# Patient Record
Sex: FEMALE | Race: WHITE | NOT HISPANIC OR LATINO | URBAN - METROPOLITAN AREA
[De-identification: names, ages, dates, MRNs, and addresses within clinical notes are randomized per-mention and may not be internally consistent; named-entity substitution may affect disease eponyms.]

---

## 2021-04-14 NOTE — PATIENT DISCUSSION
continue one dropp of latanoprost OU at bedtime. Refer for evaluation and management with glaucoma specialist Dr. Jenifer Stone when available in the next month or 2.

## 2021-04-14 NOTE — PATIENT DISCUSSION
post LASIK OU, normal IOP OU, less than normal corneal thickness OU. Abnormal OCT OU. Large C/D OU. Visual field today not reliable OD and within normal limits OS and valid OS.  Guided progression analysis possible loss NFL thickness map progression OU and possible loss average NFL thickness OD. Negative family history of glaucoma. History of high myopia prior to refractive surgery.

## 2021-05-19 ENCOUNTER — NEW PATIENT COMPREHENSIVE (OUTPATIENT)
Dept: URBAN - METROPOLITAN AREA CLINIC 49 | Facility: CLINIC | Age: 71
End: 2021-05-19

## 2021-05-19 DIAGNOSIS — H35.363: ICD-10-CM

## 2021-05-19 DIAGNOSIS — H25.13: ICD-10-CM

## 2021-05-19 DIAGNOSIS — H35.373: ICD-10-CM

## 2021-05-19 DIAGNOSIS — H52.4: ICD-10-CM

## 2021-05-19 PROCEDURE — 92015 DETERMINE REFRACTIVE STATE: CPT

## 2021-05-19 PROCEDURE — 92134 CPTRZ OPH DX IMG PST SGM RTA: CPT

## 2021-05-19 PROCEDURE — 92004 COMPRE OPH EXAM NEW PT 1/>: CPT

## 2021-05-19 ASSESSMENT — VISUAL ACUITY
OS_CC: 20/20-2
OD_CC: 20/30-2
OD_GLARE: 20/25
OD_GLARE: 20/40
OU_CC: J1+
OS_GLARE: 20/50
OD_PH: 20/25

## 2021-05-19 ASSESSMENT — TONOMETRY
OD_IOP_MMHG: 15
OS_IOP_MMHG: 15

## 2022-05-25 ENCOUNTER — COMPREHENSIVE EXAM (OUTPATIENT)
Dept: URBAN - METROPOLITAN AREA CLINIC 49 | Facility: CLINIC | Age: 72
End: 2022-05-25

## 2022-05-25 DIAGNOSIS — H35.363: ICD-10-CM

## 2022-05-25 DIAGNOSIS — H35.373: ICD-10-CM

## 2022-05-25 DIAGNOSIS — H25.13: ICD-10-CM

## 2022-05-25 PROCEDURE — 92014 COMPRE OPH EXAM EST PT 1/>: CPT

## 2022-05-25 PROCEDURE — 92134 CPTRZ OPH DX IMG PST SGM RTA: CPT

## 2022-05-25 PROCEDURE — 92015 DETERMINE REFRACTIVE STATE: CPT

## 2022-05-25 ASSESSMENT — VISUAL ACUITY
OD_GLARE: 20/50
OU_CC: J2 @ 15IN
OS_CC: 20/25
OS_GLARE: 20/25
OS_GLARE: 20/20
OU_CC: 20/20
OD_CC: 20/40+1
OD_PH: 20/25
OD_GLARE: 20/25

## 2022-05-25 ASSESSMENT — TONOMETRY
OD_IOP_MMHG: 15
OS_IOP_MMHG: 15

## 2022-09-07 NOTE — PATIENT DISCUSSION
Abnormal OCT OU With indication of progression on guided progression analysis OU. Large C/D OU.  Negative family history of glaucoma. History of high myopia prior to refractive surgery. Over one hour spent directly with patient. The importance of complying with eyedrops and follow-up explained in detail. Permanent potential loss of vision explained in detail. History of noncompliance in the past. Treatment plan includes: continue one drop of latanoprost OU at bedtime and add 0.2% brimonidine eyedrops BID OS and return in one month for a visual field and pressure check. Did not go to glaucoma specialist as advise last visit. Possible SLT OU . Continue to monitor.

## 2023-05-31 ENCOUNTER — COMPREHENSIVE EXAM (OUTPATIENT)
Dept: URBAN - METROPOLITAN AREA CLINIC 49 | Facility: CLINIC | Age: 73
End: 2023-05-31

## 2023-05-31 DIAGNOSIS — H35.363: ICD-10-CM

## 2023-05-31 DIAGNOSIS — H25.13: ICD-10-CM

## 2023-05-31 DIAGNOSIS — H52.4: ICD-10-CM

## 2023-05-31 DIAGNOSIS — H35.373: ICD-10-CM

## 2023-05-31 PROCEDURE — 92014 COMPRE OPH EXAM EST PT 1/>: CPT

## 2023-05-31 PROCEDURE — 92015 DETERMINE REFRACTIVE STATE: CPT

## 2023-05-31 PROCEDURE — 92134 CPTRZ OPH DX IMG PST SGM RTA: CPT

## 2023-05-31 ASSESSMENT — TONOMETRY
OD_IOP_MMHG: 14
OS_IOP_MMHG: 15

## 2023-05-31 ASSESSMENT — VISUAL ACUITY
OS_GLARE: 20/40
OD_GLARE: 20/25
OS_CC: 20/40
OD_CC: 20/60
OU_CC: J2@16"
OD_PH: 20/50
OS_GLARE: 20/25
OD_GLARE: 20/40

## 2023-12-18 ENCOUNTER — CONTACT LENSES/GLASSES VISIT (OUTPATIENT)
Dept: URBAN - METROPOLITAN AREA CLINIC 49 | Facility: LOCATION | Age: 73
End: 2023-12-18

## 2023-12-18 DIAGNOSIS — H52.4: ICD-10-CM

## 2023-12-18 PROCEDURE — 92015GRNC REFRACTION GLASSES RECHECK NO CHARGE

## 2023-12-18 ASSESSMENT — VISUAL ACUITY
OD_PH: 20/30
OS_CC: 20/30+2
OD_CC: 20/50

## 2024-06-03 ENCOUNTER — COMPREHENSIVE EXAM (OUTPATIENT)
Dept: URBAN - METROPOLITAN AREA CLINIC 49 | Facility: LOCATION | Age: 74
End: 2024-06-03

## 2024-06-03 DIAGNOSIS — H35.373: ICD-10-CM

## 2024-06-03 DIAGNOSIS — H02.831: ICD-10-CM

## 2024-06-03 DIAGNOSIS — H25.13: ICD-10-CM

## 2024-06-03 DIAGNOSIS — H02.834: ICD-10-CM

## 2024-06-03 DIAGNOSIS — H35.363: ICD-10-CM

## 2024-06-03 DIAGNOSIS — H52.4: ICD-10-CM

## 2024-06-03 PROCEDURE — 92015 DETERMINE REFRACTIVE STATE: CPT

## 2024-06-03 PROCEDURE — 92134 CPTRZ OPH DX IMG PST SGM RTA: CPT

## 2024-06-03 PROCEDURE — 99214 OFFICE O/P EST MOD 30 MIN: CPT

## 2024-06-03 ASSESSMENT — VISUAL ACUITY
OS_CC: J1@14"
OS_GLARE: 20/20
OU_CC: 20/20
OD_GLARE: 20/20
OS_GLARE: 20/25
OS_CC: 20/20
OD_GLARE: 20/25
OU_CC: J1@14"
OD_CC: J2@14"
OD_CC: 20/30
OD_PH: 20/25

## 2024-06-03 ASSESSMENT — KERATOMETRY
OD_K2POWER_DIOPTERS: 46.50
OS_AXISANGLE_DEGREES: 170
OD_AXISANGLE_DEGREES: 005
OD_K1POWER_DIOPTERS: 47.50
OD_AXISANGLE2_DEGREES: 95
OS_K2POWER_DIOPTERS: 46.75
OS_AXISANGLE2_DEGREES: 80
OS_K1POWER_DIOPTERS: 47.25

## 2024-06-03 ASSESSMENT — TONOMETRY
OS_IOP_MMHG: 15
OD_IOP_MMHG: 15

## 2025-07-31 ENCOUNTER — COMPREHENSIVE EXAM (OUTPATIENT)
Age: 75
End: 2025-07-31

## 2025-07-31 DIAGNOSIS — H35.3131: ICD-10-CM

## 2025-07-31 DIAGNOSIS — H18.413: ICD-10-CM

## 2025-07-31 DIAGNOSIS — H02.831: ICD-10-CM

## 2025-07-31 DIAGNOSIS — H25.13: ICD-10-CM

## 2025-07-31 DIAGNOSIS — H04.123: ICD-10-CM

## 2025-07-31 DIAGNOSIS — H02.834: ICD-10-CM

## 2025-07-31 PROCEDURE — 92134 CPTRZ OPH DX IMG PST SGM RTA: CPT

## 2025-07-31 PROCEDURE — 99214 OFFICE O/P EST MOD 30 MIN: CPT
